# Patient Record
Sex: FEMALE | Race: WHITE | NOT HISPANIC OR LATINO
[De-identification: names, ages, dates, MRNs, and addresses within clinical notes are randomized per-mention and may not be internally consistent; named-entity substitution may affect disease eponyms.]

---

## 2021-08-09 PROBLEM — Z00.00 ENCOUNTER FOR PREVENTIVE HEALTH EXAMINATION: Status: ACTIVE | Noted: 2021-08-09

## 2021-08-16 ENCOUNTER — NON-APPOINTMENT (OUTPATIENT)
Age: 51
End: 2021-08-16

## 2021-08-19 ENCOUNTER — NON-APPOINTMENT (OUTPATIENT)
Age: 51
End: 2021-08-19

## 2021-08-19 ENCOUNTER — APPOINTMENT (OUTPATIENT)
Dept: ENDOCRINOLOGY | Facility: CLINIC | Age: 51
End: 2021-08-19
Payer: COMMERCIAL

## 2021-08-19 VITALS
HEIGHT: 68 IN | SYSTOLIC BLOOD PRESSURE: 125 MMHG | DIASTOLIC BLOOD PRESSURE: 77 MMHG | WEIGHT: 189 LBS | HEART RATE: 55 BPM | BODY MASS INDEX: 28.64 KG/M2

## 2021-08-19 DIAGNOSIS — E21.0 PRIMARY HYPERPARATHYROIDISM: ICD-10-CM

## 2021-08-19 DIAGNOSIS — Z72.89 OTHER PROBLEMS RELATED TO LIFESTYLE: ICD-10-CM

## 2021-08-19 PROCEDURE — 99203 OFFICE O/P NEW LOW 30 MIN: CPT

## 2021-08-19 NOTE — REASON FOR VISIT
[Initial Evaluation] : an initial evaluation [Menopause Symptoms] : menopause symptoms [Hypercalcemia] : hypercalcemia [FreeTextEntry2] : Dr Ila Sher

## 2021-08-19 NOTE — ASSESSMENT
[FreeTextEntry1] : Mild Hypercalcemia, likely due to primary hyperparathyroidism, given unsuppressed PTH level (inappropriately normal).\par Explained that only cure for primary hyperpara is surgery (parathyroidectomy) but not all patients need surgery.  If calcium is < 11.5, and bone density is not in osteoporosis range,  GFR not reduced by more than 30%, and 24 hour urine < 400mg/24 hours, then calcium can be monitored.  Advised not to take calcium supplements, but dietary calcium (eg dairy) is ok.  Avoid dehydration, as dehydration may increase calcium further. Keep vitamin D replete, goal 25 D > 25 ng/ml. Advised to take OTC vitamin D, 100 IU/day.\par Since bone density is normal and calcium is < 11.0, she can likely be monitored.\par I will send her for 24h urine calcium and recheck vitamin D level at [facility]\par RTO 1 year

## 2021-08-19 NOTE — DATA REVIEWED
[FreeTextEntry1] : 6/21: Ca 10.7, PTH 63, tot chol 237, HDL 74, trig 90, \par 5/21: Ca 10.9, Cr 0.66, tot chol 281, HDL 83, trig 96 , TSH 2.40, thyroid Ab neg, 25D 24, A1c 5.1%\par \par bone density, Hologic ([facility]), 6/21\par fem neck T +0.7\par tot hip T +0.5\par L1-L4,  T +0.5

## 2021-08-19 NOTE — CONSULT LETTER
[Dear  ___] : Dear  [unfilled], [Consult Letter:] : I had the pleasure of evaluating your patient, [unfilled]. [Please see my note below.] : Please see my note below. [Consult Closing:] : Thank you very much for allowing me to participate in the care of this patient.  If you have any questions, please do not hesitate to contact me. [FreeTextEntry1] : Ms. Lane has mild hypercalcemia, probably due to primary hyperparathyroidism.  Since calcium is  < 11.0 and bone density is normal, I anticipate she can be monitored, though I explained about 1/3 of patients eventually require surgery.\par I am sending her for 24 h urine calcium collection and repeat vitamin D check in about a month.\par  [Sincerely,] : Sincerely, [FreeTextEntry3] : Tameka Villafana MD\par Division of Endocrinology\par [facility]

## 2021-08-19 NOTE — HISTORY OF PRESENT ILLNESS
[FreeTextEntry1] : Found to have hypercalcemia this year.  She admits, she had not been to doctor in a while before this check.\par no history of fractures or kidney stones\par LMP July 2018 and menopause was mild.\par She had gained weight over the years and lost 15 lb with diet and exercise.\par Drinks a lot of water (not new) so then has to urinate often.\par Some bloating with certain foods.  no diarrhea.  Has had GI issues since her daughter was born (she is now 11 years old)\par goes running 4-5x/week.\par was prescribed levothyroxine during pregnancy only.  MOther has a neck scar but she doesn't know the diagnosis.\par labs and bone density reviewed:  Ca 10.9, 10.4,  normal bone density\par \par Meds:\par multivitamin\par

## 2022-09-23 ENCOUNTER — APPOINTMENT (OUTPATIENT)
Dept: ENDOCRINOLOGY | Facility: CLINIC | Age: 52
End: 2022-09-23

## 2022-09-23 VITALS
DIASTOLIC BLOOD PRESSURE: 76 MMHG | BODY MASS INDEX: 27.98 KG/M2 | SYSTOLIC BLOOD PRESSURE: 117 MMHG | HEART RATE: 56 BPM | WEIGHT: 184 LBS

## 2022-09-23 PROCEDURE — 99213 OFFICE O/P EST LOW 20 MIN: CPT

## 2022-09-23 NOTE — ASSESSMENT
[FreeTextEntry1] : Primary hyperparathyroidism\par ok to continue monitoring calcium.   consider surgery (parathyroidectomy) if calcium over 11.5.  \par Avoid dehydration.\par Hair loss likely due to post menopause status/aging.\par RTO 1 year or if calcium over 11.5

## 2022-09-23 NOTE — HISTORY OF PRESENT ILLNESS
[FreeTextEntry1] : Found to have herniated disk in May (L4-L5), says summer wasn't bad\par Taking gabapentin and diclofenac controls radiculopathy symptoms and she is also going to PT.  Also does yoga\par drinking a lot of water but no polyuria or polydipsia\par having increased hair loss\par labs reviewed from 6/22:  calcium 11.0, was fasting\par \par Meds:\par gabapentin\par diclofenac but switching to Tylenol\par multivitamin\par